# Patient Record
Sex: FEMALE | Race: WHITE
[De-identification: names, ages, dates, MRNs, and addresses within clinical notes are randomized per-mention and may not be internally consistent; named-entity substitution may affect disease eponyms.]

---

## 2021-12-26 ENCOUNTER — HOSPITAL ENCOUNTER (EMERGENCY)
Dept: HOSPITAL 79 - ER1 | Age: 66
Discharge: HOME | End: 2021-12-26
Payer: COMMERCIAL

## 2021-12-26 DIAGNOSIS — E87.6: ICD-10-CM

## 2021-12-26 DIAGNOSIS — E11.9: ICD-10-CM

## 2021-12-26 DIAGNOSIS — R11.2: Primary | ICD-10-CM

## 2021-12-26 DIAGNOSIS — Z20.822: ICD-10-CM

## 2021-12-26 DIAGNOSIS — Z86.73: ICD-10-CM

## 2021-12-26 DIAGNOSIS — I10: ICD-10-CM

## 2021-12-26 LAB
BUN/CREATININE RATIO: 14 (ref 0–10)
HGB BLD-MCNC: 13.9 GM/DL (ref 12.3–15.3)
RED BLOOD COUNT: 4.87 M/UL (ref 4–5.1)
WHITE BLOOD COUNT: 10.9 K/UL (ref 4.5–11)

## 2021-12-26 PROCEDURE — U0002 COVID-19 LAB TEST NON-CDC: HCPCS

## 2024-08-21 ENCOUNTER — OFFICE VISIT (OUTPATIENT)
Dept: UROLOGY | Facility: CLINIC | Age: 69
End: 2024-08-21
Payer: MEDICARE

## 2024-08-21 VITALS
HEIGHT: 64 IN | BODY MASS INDEX: 35.51 KG/M2 | HEART RATE: 80 BPM | WEIGHT: 208 LBS | DIASTOLIC BLOOD PRESSURE: 105 MMHG | SYSTOLIC BLOOD PRESSURE: 172 MMHG

## 2024-08-21 DIAGNOSIS — R10.30 LOWER ABDOMINAL PAIN: ICD-10-CM

## 2024-08-21 DIAGNOSIS — N39.42 URINARY INCONTINENCE WITHOUT SENSORY AWARENESS: ICD-10-CM

## 2024-08-21 DIAGNOSIS — N32.81 OVERACTIVE BLADDER: ICD-10-CM

## 2024-08-21 DIAGNOSIS — R35.0 FREQUENCY OF MICTURITION: ICD-10-CM

## 2024-08-21 DIAGNOSIS — N39.0 RECURRENT UTI (URINARY TRACT INFECTION): Primary | ICD-10-CM

## 2024-08-21 DIAGNOSIS — N32.81 DETRUSOR HYPERREFLEXIA OF BLADDER: ICD-10-CM

## 2024-08-21 DIAGNOSIS — E11.65 TYPE 2 DIABETES MELLITUS WITH HYPERGLYCEMIA, UNSPECIFIED WHETHER LONG TERM INSULIN USE: ICD-10-CM

## 2024-08-21 DIAGNOSIS — R33.9 INCOMPLETE EMPTYING OF BLADDER: ICD-10-CM

## 2024-08-21 PROCEDURE — 1159F MED LIST DOCD IN RCRD: CPT | Performed by: NURSE PRACTITIONER

## 2024-08-21 PROCEDURE — 99204 OFFICE O/P NEW MOD 45 MIN: CPT | Performed by: NURSE PRACTITIONER

## 2024-08-21 PROCEDURE — 87086 URINE CULTURE/COLONY COUNT: CPT | Performed by: NURSE PRACTITIONER

## 2024-08-21 PROCEDURE — 1160F RVW MEDS BY RX/DR IN RCRD: CPT | Performed by: NURSE PRACTITIONER

## 2024-08-21 RX ORDER — HYDROCHLOROTHIAZIDE 25 MG/1
TABLET ORAL
COMMUNITY

## 2024-08-21 RX ORDER — BRIMONIDINE TARTRATE 2 MG/ML
SOLUTION/ DROPS OPHTHALMIC
COMMUNITY
Start: 2024-07-15

## 2024-08-21 RX ORDER — ATORVASTATIN CALCIUM 40 MG/1
TABLET, FILM COATED ORAL
COMMUNITY
Start: 2024-06-20

## 2024-08-21 RX ORDER — METFORMIN HYDROCHLORIDE 500 MG/1
TABLET, EXTENDED RELEASE ORAL
COMMUNITY

## 2024-08-21 RX ORDER — CLONIDINE HYDROCHLORIDE 0.2 MG/1
TABLET ORAL
COMMUNITY

## 2024-08-21 RX ORDER — LISINOPRIL 40 MG/1
TABLET ORAL
COMMUNITY
Start: 2024-06-20

## 2024-08-21 RX ORDER — LIRAGLUTIDE 6 MG/ML
INJECTION SUBCUTANEOUS DAILY
COMMUNITY

## 2024-08-21 RX ORDER — DULOXETIN HYDROCHLORIDE 60 MG/1
CAPSULE, DELAYED RELEASE ORAL
COMMUNITY
Start: 2024-07-26

## 2024-08-21 RX ORDER — TOLTERODINE 4 MG/1
CAPSULE, EXTENDED RELEASE ORAL
COMMUNITY

## 2024-08-21 RX ORDER — NEBIVOLOL 5 MG/1
5 TABLET ORAL DAILY
COMMUNITY

## 2024-08-21 RX ORDER — VIBEGRON 75 MG/1
1 TABLET, FILM COATED ORAL NIGHTLY
Qty: 30 TABLET | Refills: 0 | COMMUNITY
Start: 2024-08-21 | End: 2024-09-20

## 2024-08-21 NOTE — PROGRESS NOTES
Chief Complaint:  RECURRENT UTI  and DETRUSOR HYPERFLEXIA (NEW PATIENT WITH OAB/DH/RECURRENT UTI/MIXED URINE INCONTINENCE)    Subjective          Lesli Julian presents to [facility] for OAB/DH/RECURRENT UTI  History of Present Illness   History of Present Illness  The patient is a pleasant 69-year-old female who presents to the clinic today for evaluation as a new patient consult. She has been referred to the clinic with concerns of recurrent urinary tract infections (UTIs), detrusor hyperreflexia, and overactive bladder, characterized by bouts of urine frequency, urgency, and incontinence without sensory awareness. These symptoms have been ongoing for 3 years and have recently worsened.For her overactive bladder and urinary incontinence, she was started on Detrol LA by her primary care physician (PCP) but reports minimal improvement.    She reports recurrent UTIs requiring numerous antibiotic therapies and has just finished a course of levofloxacin 500 mg, taken twice a day for 3 days.  Patient is not sure about positive urine cultures.  She reports most UTIs she is asymptomatic with dysuria or burning with urination, she reports urine frequency but denies pelvic pressure or suprapubic discomfort.  She denies any gross hematuria.  During today's clinic evaluation, she took Pyridium, so her urine will be sent for culture.     Her daughter, who accompanies her, provides additional history. The patient had a stroke 4 years ago, resulting in significant debility. Her father passed away about a year ago, and her daughter has been taking care of her while also managing her own three children. The patient has lost 120 pounds, and her diabetes is now well-controlled, with her A1c down to 5.  It was previously at 9.5.     Nevertheless, despite these improvements, she continues to experience incontinence. The incontinence was severe enough that urine would leak when she stood  up. Although her condition has improved with weight loss and better diabetes management, she remains incontinent. The recurrent UTIs are particularly distressing, with symptoms returning about 3 to 4 weeks after completing antibiotic treatment. Her daughter emphasizes the need for urine cultures to identify the causative organism, as repeated TMC.  Again, likely 8 courses of levofloxacin may not be effective if the bacteria are resistant.    When experiencing UTIs, the patient does not feel typical symptoms such as burning, frequency, or urgency but does experience altered mental status. She urinates frequently, especially in the mornings when she often cannot control it upon standing. She drinks about 3 to 4 water bottles daily and stops drinking water around 8 PM but still urinates frequently at night, getting up 1 or 2 times. She has a potty next to her bed.    The patient used to be in a wheelchair but now uses a walker. She had a  in the past and believes her bladder may be prolapsing. She has been on Detrol LA for about a year and previously tried oxybutynin without success. She takes Detrol LA at 9 AM. She has no known allergies and has not had recent blood work done.  .  Clear TMs see she has bilateral lower extremity lymphedema, more pronounced on the left side, but her legs do not hurt and have improved. She fell 3 weeks ago. She has had bowel problems since her teenage years, requiring stool softeners to avoid painful bowel movements. Without medication, she can go 2 weeks without a bowel movement. She manages her irritable bowel syndrome (IBS) on her own and has not had any imaging done. She did not have regular menstrual periods and was surprised to have had two children. She has not experienced menopause.    FAMILY HISTORY  She denies any family history of bladder cancer or female cancers.    Active Ambulatory Problems     Diagnosis Date Noted    Urinary incontinence without sensory awareness  "08/21/2024    Recurrent UTI (urinary tract infection) 08/21/2024    Frequency of micturition 08/21/2024    Overactive bladder 08/21/2024    Incomplete emptying of bladder 08/21/2024    Type 2 diabetes mellitus with hyperglycemia 08/21/2024    Lower abdominal pain 08/21/2024     Resolved Ambulatory Problems     Diagnosis Date Noted    No Resolved Ambulatory Problems     Past Medical History:   Diagnosis Date    Diabetes mellitus     Hypertension     Stroke     Urinary incontinence     Urinary tract infection       Objective   Vital Signs:   BP (!) 172/105   Pulse 80   Ht 162.6 cm (64\")   Wt 94.3 kg (208 lb)   BMI 35.70 kg/m²       ROS:   Review of Systems   Constitutional:  Positive for activity change, appetite change, fatigue and unexpected weight gain. Negative for chills, diaphoresis, fever and unexpected weight loss.   HENT:  Negative for congestion, ear discharge, ear pain, nosebleeds, rhinorrhea, sinus pressure and sore throat.    Eyes:  Negative for blurred vision, double vision, photophobia, pain, redness and visual disturbance.   Respiratory:  Negative for apnea, cough, chest tightness, shortness of breath, wheezing and stridor.    Cardiovascular:  Negative for chest pain and palpitations.   Gastrointestinal:  Positive for abdominal distention and abdominal pain. Negative for constipation, diarrhea, nausea and vomiting.   Endocrine: Negative for polydipsia, polyphagia and polyuria.   Genitourinary:  Positive for flank pain, frequency, pelvic pain, pelvic pressure, urgency and urinary incontinence. Negative for decreased urine volume, difficulty urinating, dyspareunia, dysuria, genital sores, hematuria and vaginal discharge.   Musculoskeletal:  Positive for myalgias. Negative for arthralgias, back pain and joint swelling.   Skin:  Negative for pallor, rash and wound.   Neurological:  Negative for dizziness, tremors, syncope, weakness, light-headedness, headache, memory problem and confusion. "   Psychiatric/Behavioral:  Positive for sleep disturbance and stress. Negative for behavioral problems and decreased concentration.         Physical Exam  Constitutional:       General: She is in acute distress.      Appearance: She is well-developed. She is obese. She is ill-appearing.   HENT:      Head: Normocephalic and atraumatic.   Eyes:      Pupils: Pupils are equal, round, and reactive to light.   Neck:      Thyroid: No thyromegaly.      Trachea: No tracheal deviation.   Cardiovascular:      Rate and Rhythm: Normal rate and regular rhythm.      Heart sounds: No murmur heard.  Pulmonary:      Effort: Pulmonary effort is normal. No respiratory distress.      Breath sounds: Normal breath sounds. No stridor. No wheezing.   Abdominal:      General: Bowel sounds are normal. There is distension.      Palpations: Abdomen is soft.      Tenderness: There is no abdominal tenderness.   Genitourinary:     Labia:         Right: No tenderness.         Left: No tenderness.       Vagina: Normal. No vaginal discharge.      Comments: Reports urine frequency, urgency, incontinence without sensory awareness  Musculoskeletal:         General: Tenderness present. No deformity. Normal range of motion.      Cervical back: Normal range of motion.   Skin:     General: Skin is warm and dry.      Capillary Refill: Capillary refill takes less than 2 seconds.      Coloration: Skin is not pale.      Findings: No erythema or rash.   Neurological:      Mental Status: She is alert and oriented to person, place, and time.      Cranial Nerves: No cranial nerve deficit.      Sensory: No sensory deficit.      Motor: Weakness present.      Coordination: Coordination normal.   Psychiatric:         Behavior: Behavior normal.         Thought Content: Thought content normal.         Judgment: Judgment normal.        Physical Exam    Result Review :              Assessment and Plan    Problem List Items Addressed This Visit          Endocrine and  Metabolic    Type 2 diabetes mellitus with hyperglycemia    Relevant Medications    metFORMIN ER (GLUCOPHAGE-XR) 500 MG 24 hr tablet    Liraglutide (Victoza) 18 MG/3ML solution pen-injector injection    Other Relevant Orders    Hemoglobin A1c    CT Abdomen Pelvis With & Without Contrast       Gastrointestinal Abdominal     Lower abdominal pain    Relevant Orders    CT Abdomen Pelvis With & Without Contrast       Genitourinary and Reproductive     Urinary incontinence without sensory awareness    Relevant Medications    Vibegron (Gemtesa) 75 MG tablet    Other Relevant Orders    Comprehensive Metabolic Panel    CBC & Differential    Recurrent UTI (urinary tract infection) - Primary    Relevant Orders    Comprehensive Metabolic Panel    CBC & Differential    CT Abdomen Pelvis With & Without Contrast    Frequency of micturition    Relevant Medications    Vibegron (Gemtesa) 75 MG tablet    Other Relevant Orders    Urine Culture - Urine, Urine, Random Void    Comprehensive Metabolic Panel    CBC & Differential    Overactive bladder    Relevant Medications    tolterodine LA (DETROL LA) 4 MG 24 hr capsule    Vibegron (Gemtesa) 75 MG tablet    Other Relevant Orders    CT Abdomen Pelvis With & Without Contrast    Incomplete emptying of bladder    Relevant Orders    Bladder Scan (Completed)       Assessment & Plan       ASSESSMENT  Recurrent urinary tract infections/OAB/DETRUSOR HYPERFLEXIA. WITH URINE INCONTINENCE  MS JOE RODRIGUEZ IS A Very pleasant and energetic 69 year old female evaluated in clinic today for Recurrent UTI's and MIXED Urinary incontinence episodes that have been ongoing recently becoming very bothersome to her.  She has had recurrent urinary tract infections requiring multiple antibiotic therapy. FOR OAB/DH WITH MERNA,  Patient reports SHE has been on Detrol LA for over a year with minimal improvement in her symptoms.  Prior to that she had failed therapy with oxybutynin.    ON Clinic evaluation today,  she is post recent therapy with levofloxacin 500 mg p.o. twice daily x 5 days. She reports doing relatively better on her antibiotic prophylaxis, and would like to continue. Upon evaluation today, she has minimal frequency at times.  However, she DENIES having any more episodes of perineal irritation and yeast, or urine urgency. She denies back/abdominal pain, flank pain, she does not have CVA tenderness or pain at her pubic area. She has no urinary symptoms of dysuria, or burning on urination. She has not had any gross hematuria. She denies N/V/D. However she did take Pyridium secondary to dysuria so her urine will be sent for culture.      RECURRENT UTI: We discussed the types of organisms that are found in the urinary tract indicating that the vast majority are results of the patient's own gastrointestinal randal.  We discussed how many of the antibiotics that are utilized can actually exacerbate these infections by creating resistant organisms and there is only a very few antibiotics that are concentrated in the urine and do not affect the rectal reservoir nor cause recurrent yeast vaginitis.      We discussed the risk factors for recurrent infections being intercourse in younger patients and atrophic changes in older patients.  We discussed the symptoms that are found including pain, pressure, burning, frequency, urgency suprapubic pain and painful intercourse.  I discussed upper tract symptoms including fevers, chills, and indicated the workup would be much more aggressive if the patient were to present with recurrent infections in the face of upper tract symptomatology such as fever. I discussed the history of vesicoureteral reflux in young patients and finally chronic renal scarring as a result of such.     DETRUSOR HYPERFLEXIA/Overactive Bladder/Urinary Incontinence:  Very pleasant patient evaluated in clinic today with mixed urinary incontinence symptoms that have been ongoing since her over 4 years stroke  and now gradually becoming very bothersome to her.  She has failed multiple therapy in the last 4 years, none recently she was started on on oxybutynin 5 mg twice daily by her PCP, and later transition to Detrol LA with minimal benefits.  We discussed treatable and non-treatable causes of both stress and urge urinary incontinence.     With regards to stress urinary incontinence we discussed its relationship to childbirth and pelvic health.  We discussed the grading of stress incontinence with trying to quantitate the number of pads used.  We talked about leaking urine with laughing, lifting, coughing, and sexual intercourse. Talked about the Urge component and the concept of mixed incontinence where upon the stress is treatable, but the urge may exist and that 50% of the time the urge will resolve with treatment of the stress incontinence.  We talked with the diagnostic workup including a postvoid residual urine, OR even a simple cystometrogram.  I discussed the findings that may be neurologically related including commonly seen with multiple sclerosis, Parkinson's disease, and stroke. I talked about the various therapeutic options including anticholinergics, beta 3 agonists, and alpha blockade if there is a component of obstruction.  I discussed the side effects of anti-cholinergic including dry mouth, double vision.    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 He is in no apparent discomfort today, and  Denies having any urinary symptoms consistent with and infection.  We discussed Detrussor Hyperflexia as a frequently occurring condition. The symptomatology is characterized by frequency, urgency and urge incontinence. DH is defined as involuntary, uninhibited detrusor contractions of an upper motor neuron lesion that results in significant bladder instability with urinary frequency and urgency.It is not uncommon to be found after stroke, multiple sclerosis, or other more rare upper motor  neuron lesions.  It responds very nicely to anticholinergic medication,     Anticholinergic medication: I talked about the various therapeutic options including anticholinergics, beta 3 agonists, and alpha blockade if there is a component of obstruction. I discussed the side effects of anti-cholinergic including dry mouth, double vision. HOWEVER, we are recommending the use of an anticholinergic. We discussed the class of drugs.  We discussed the older drug such as oxybutynin with his increased spectrum of side effects such as dry mouth, dry eyes constipation versus the newer medications with lower side effects but more difficult to obtain via insurance and much more expensive finally the newest class of drugs which is Myrbetriq/GEMTESA which has a very favorable side effect profile but unfortunately is prohibitively expensive and oftentimes requires precertification of which may be unsuccessful in many other cases  .      PLAN    We REsent her urine for culture, due to concerns of frequency, urgency, pelvic pain and pressure.  I will call her with results if any positive bacterial growth.    Will Continue Macrobid 100 mg PO Daily -Suppressive Therapy IF POSITIVE URINE CULTURE     She should increase her p.o. fluid intake to at least 1 to 2 L daily and avoid bladder irritants such as caffeine products, spicy foods, and citrusy foods.     I recommend concomitant probiotics with treatment with antibiotics to protect the rectal reservoir including over-the-counter yogurt preparations to dolores oral pills containing the appropriate probiotics. Patient reports the diligent use of Probiotics.    Stop DETROL LA per PCP,-Patient Complains of Extreme Dry mouth    Start GEMTESA 75 mg daily.-Samples given to try X 1 month    Discussed upper tract investigation, patient WILL BE SCHEDULED FOR CT scan abdomen/pelvis     Discussed lower tract investigation, Patient wants to wait.-Try GEMTESA first.    Discussed things she can do to  help such as her weight control, keeping a bladder diary with strict intake and output of what she eats or drinks, how often she urinates, how much she urinates.      She should follow a timed voiding bladder training program, such as limiting the amount of time between bathroom breaks, using the bathroom at regular intervals such as every 2-3 hours.  And using techniques to suppress bladder urges.      Also discussed pelvic floor muscle exercises, and do Keagle exercises to strengthen the muscles to help control urination.    We will follow-up in 4 weeks, Evaluate medication Effectiveness.    Patient is Agreeable to plan of care.     1. Recurrent Urinary Tract Infections (UTIs-SUMMARY).  She has been experiencing recurrent UTIs, which may be resistant to levofloxacin. A urine culture will be performed today to identify the bacteria causing the infections. Based on the culture results, antibiotic suppressive therapy will be initiated. Macrobid is considered, but its use will depend on her kidney function. She should take Macrobid once at night, and if symptoms worsen, she can increase the dosage to twice a night. Her urine will be sent for culture, and blood work will be done to check her kidney function and A1c levels. A complete metabolic panel (CMP) will be ordered. She should continue drinking four bottles of water daily until 8 PM. She should provide a urine sample in two weeks for recheck by her primary care physician (PCP).    2. Overactive Bladder and Urinary Incontinence.  She has been on Detrol LA for about a year with minimal improvement and previously tried oxybutynin without success. Samples of Gemtesa, a stronger medication, will be provided for her to try. She should take this medication in the evening. She should continue to monitor her urine to prevent further infections.    3. Diabetes Mellitus.  Her A1c levels have improved, dropping from 9.5 to around half of that value. Blood work will be done  to check her A1c levels. She should withhold her diabetes medications the night before the contrast study.    4. Lymphedema.  She has extensive bilateral lower extremity lymphedema, more pronounced on the left side. No recent imaging studies have been conducted. She should continue managing her lymphedema and follow up with her PCP.    5. Constipation.  She has a history of bowel issues since her teenage years, requiring stool softeners for relief. She is advised to take a basic stool softener daily. If this is not effective, she can double the dosage. If she prefers, she can use MiraLAX powder instead of pills.    6. Hemiplegia.  She had a stroke in 2020, resulting in significant debility. She should continue her current care plan and follow up with her healthcare providers as needed.    7. Health Maintenance.  A CT scan of the abdomen and pelvis is recommended before her next appointment.    Follow-up  She will follow up in clinic in 1 month.  Patient reports that she is not currently experiencing any symptoms of urinary incontinence.      Class 2 Severe Obesity (BMI >=35 and <=39.9). Obesity-related health conditions include the following: obstructive sleep apnea, hypertension, coronary heart disease, dyslipidemias, and GERD. Obesity is improving with lifestyle modifications. BMI is  36 5.70 kg . We discussed portion control and increasing exercise.      RADIOLOGY (CT AND/OR KUB):    CT Abdomen and Pelvis: No results found for this or any previous visit.     CT Stone Protocol: No results found for this or any previous visit.     KUB: No results found for this or any previous visit.       [unfilled]  LABS (3 MONTHS):    No visits with results within 3 Month(s) from this visit.   Latest known visit with results is:   No results found for any previous visit.          Smoking Cessation Counseling:  Never a smoker.  Patient does not currently use any tobacco products.         Follow Up   Return in about 1 month  (around 9/23/2024) for Next scheduled follow up, RECURRENT UTI/DYSURIA/DETRUSSOR INSTABILITY/URINE INCONTINENCE-EVAL MEDS.    Patient was given instructions and counseling regarding her condition or for health maintenance advice. Please see specific information pulled into the AVS if appropriate.          This document has been electronically signed by Griselda Cheng-Akwa, APRN   August 21, 2024 22:07 EDT      Dictated Utilizing Dragon Dictation: Part of this note may be an electronic transcription/translation of spoken language to printed text using the Dragon Dictation System.      Patient or patient representative verbalized consent for the use of Ambient Listening during the visit with  Griselda Cheng-Akwa, APRN for chart documentation. 8/21/2024  22:07 EDT

## 2024-08-22 LAB — BACTERIA SPEC AEROBE CULT: NO GROWTH

## 2024-08-26 ENCOUNTER — TELEPHONE (OUTPATIENT)
Dept: UROLOGY | Facility: CLINIC | Age: 69
End: 2024-08-26
Payer: MEDICARE

## 2024-08-26 NOTE — TELEPHONE ENCOUNTER
I called and spoke with the pt letting her know that her urine culture was negative for any bacterial behavior. The pt verbalized understanding.                                   ----- Message from Griselda Cheng-Akwa sent at 8/26/2024 11:26 AM EDT -----  PLEASE KINDLY  let patient know her urine culture results ARE negative for any bacterial infection at this time.    Urine Culture - No growth    However she may drop off another urine dip if she feels symptomatic,     Continue antibiotic suppressive therapy taking 1 time nightly ONLY if indicated    if not increase p.o. fluid intake and follow-up in clinic as discussed.    THANK YOU